# Patient Record
Sex: MALE | Race: WHITE | Employment: UNEMPLOYED | URBAN - NONMETROPOLITAN AREA
[De-identification: names, ages, dates, MRNs, and addresses within clinical notes are randomized per-mention and may not be internally consistent; named-entity substitution may affect disease eponyms.]

---

## 2021-12-28 ENCOUNTER — OFFICE VISIT (OUTPATIENT)
Dept: URGENT CARE | Age: 31
End: 2021-12-28
Payer: COMMERCIAL

## 2021-12-28 VITALS
HEART RATE: 72 BPM | BODY MASS INDEX: 30.88 KG/M2 | DIASTOLIC BLOOD PRESSURE: 74 MMHG | RESPIRATION RATE: 18 BRPM | OXYGEN SATURATION: 96 % | WEIGHT: 233 LBS | TEMPERATURE: 97.9 F | SYSTOLIC BLOOD PRESSURE: 118 MMHG | HEIGHT: 73 IN

## 2021-12-28 DIAGNOSIS — J06.9 UPPER RESPIRATORY TRACT INFECTION, UNSPECIFIED TYPE: Primary | ICD-10-CM

## 2021-12-28 LAB
INFLUENZA A ANTIBODY: NEGATIVE
INFLUENZA B ANTIBODY: NEGATIVE
SARS-COV-2, PCR: DETECTED

## 2021-12-28 PROCEDURE — 99213 OFFICE O/P EST LOW 20 MIN: CPT | Performed by: NURSE PRACTITIONER

## 2021-12-28 PROCEDURE — 87804 INFLUENZA ASSAY W/OPTIC: CPT | Performed by: NURSE PRACTITIONER

## 2021-12-28 RX ORDER — METHYLPREDNISOLONE 4 MG/1
TABLET ORAL
Qty: 1 KIT | Refills: 0 | Status: SHIPPED | OUTPATIENT
Start: 2021-12-28 | End: 2022-01-03

## 2021-12-28 ASSESSMENT — ENCOUNTER SYMPTOMS
SINUS COMPLAINT: 1
SINUS PRESSURE: 1
SINUS PAIN: 1
COUGH: 1
ABDOMINAL PAIN: 0
VOMITING: 0
WHEEZING: 0
SHORTNESS OF BREATH: 0
NAUSEA: 1

## 2021-12-28 NOTE — PATIENT INSTRUCTIONS

## 2021-12-28 NOTE — PROGRESS NOTES
[address] [place] [address]   [address], [address]     Phone:  [phone]  Fax:  [phone]      Jessicarand Salcedo is a 27 y.o. male who presents today for his medical conditions/complaints as noted below. Claude Cloud. is c/o of Sinus Problem, Chills, Nausea, and Generalized Body Aches (joint pain )      Chief Complaint   Patient presents with    Sinus Problem    Chills    Nausea    Generalized Body Aches     joint pain        HPI:   Paulie Tessa. presents today for   Sinus Problem  This is a new problem. The current episode started in the past 7 days. The problem has been gradually worsening since onset. There has been no fever (unsure). The pain is mild. Associated symptoms include chills, congestion, coughing, headaches and sinus pressure. Pertinent negatives include no shortness of breath. Past treatments include acetaminophen and oral decongestants. The treatment provided no relief. Generalized Body Aches  This is a new problem. The current episode started in the past 7 days (2 days). The problem occurs constantly. The problem has been rapidly worsening. Associated symptoms include chills, congestion, coughing, a fever, headaches and nausea. Pertinent negatives include no abdominal pain, fatigue or vomiting. Nothing aggravates the symptoms. He has tried acetaminophen (decongestant) for the symptoms. The treatment provided no relief. He has had J&J COVID vaccine. He has been working in [email or web address]. No past medical history on file. No past surgical history on file. Social History     Tobacco Use    Smoking status: Current Every Day Smoker     Packs/day: 0.50     Types: Cigarettes    Smokeless tobacco: Not on file   Substance Use Topics    Alcohol use: No        Current Outpatient Medications   Medication Sig Dispense Refill    methylPREDNISolone (MEDROL DOSEPACK) 4 MG tablet Take by mouth.  1 kit 0     No current facility-administered medications for this visit. No Known Allergies    No family history on file. Review of Systems   Constitutional: Positive for chills and fever. Negative for fatigue. HENT: Positive for congestion, sinus pressure and sinus pain. Respiratory: Positive for cough. Negative for shortness of breath and wheezing. Cardiovascular: Negative. Gastrointestinal: Positive for nausea. Negative for abdominal pain and vomiting. Neurological: Positive for headaches. Objective:     Physical Exam  Vitals and nursing note reviewed. Constitutional:       General: He is not in acute distress. Appearance: Normal appearance. He is well-developed. He is not ill-appearing, toxic-appearing or diaphoretic. HENT:      Head: Normocephalic and atraumatic. Right Ear: Tympanic membrane, ear canal and external ear normal. There is no impacted cerumen. Left Ear: Tympanic membrane, ear canal and external ear normal. There is no impacted cerumen. Nose: Congestion present. No rhinorrhea. Mouth/Throat:      Pharynx: Oropharynx is clear. No oropharyngeal exudate or posterior oropharyngeal erythema. Eyes:      General:         Right eye: No discharge. Left eye: No discharge. Conjunctiva/sclera: Conjunctivae normal.   Cardiovascular:      Rate and Rhythm: Normal rate and regular rhythm. Heart sounds: Normal heart sounds. Pulmonary:      Effort: Pulmonary effort is normal. No respiratory distress. Breath sounds: Normal breath sounds. No stridor. No wheezing, rhonchi or rales. Musculoskeletal:         General: Normal range of motion. Cervical back: Normal range of motion and neck supple. Lymphadenopathy:      Cervical: No cervical adenopathy. Skin:     General: Skin is warm and dry. Capillary Refill: Capillary refill takes less than 2 seconds. Findings: No rash. Neurological:      Mental Status: He is alert and oriented to person, place, and time.       Motor: No weakness. Gait: Gait normal.   Psychiatric:         Mood and Affect: Mood normal.         Behavior: Behavior normal.         Thought Content: Thought content normal.         /74   Pulse 72   Temp 97.9 °F (36.6 °C) (Temporal)   Resp 18   Ht 6' 1\" (1.854 m)   Wt 233 lb (105.7 kg)   SpO2 96%   BMI 30.74 kg/m²     Assessment:      Diagnosis Orders   1. Upper respiratory tract infection, unspecified type  POCT Influenza A/B    COVID-19    methylPREDNISolone (MEDROL DOSEPACK) 4 MG tablet       Results for orders placed or performed in visit on 12/28/21   POCT Influenza A/B   Result Value Ref Range    Influenza A Ab Negative     Influenza B Ab Negative        Plan:     Flu negative  Start steroid pack,MVI, and Robitussin as needed  COVID test today  Quarantine until results return    Return if symptoms worsen or fail to improve. Orders Placed This Encounter   Procedures    COVID-19     Scheduling Instructions:      1) Due to current limited availability of the COVID-19 test, tests will be prioritized based on responses to questions above. Testing may be delayed due to volume. 2) Print and instruct patient to adhere to CDC home isolation program. (Link Above)              3) Set up or refer patient for a monitoring program.              4) Have patient sign up for and leverage [employer] (if not previously done). Order Specific Question:   Is this test for diagnosis or screening? Answer:   Diagnosis of ill patient     Order Specific Question:   Symptomatic for COVID-19 as defined by CDC? Answer:   Yes     Order Specific Question:   Date of Symptom Onset     Answer:   12/22/2021     Order Specific Question:   Hospitalized for COVID-19? Answer:   No     Order Specific Question:   Admitted to ICU for COVID-19? Answer:   No     Order Specific Question:   Employed in healthcare setting? Answer:   No     Order Specific Question:   Resident in a congregate (group) care setting? Answer:   No     Order Specific Question:   Pregnant: Answer:   No     Order Specific Question:   Previously tested for COVID-19? Answer:   No    POCT Influenza A/B       Orders Placed This Encounter   Medications    methylPREDNISolone (MEDROL DOSEPACK) 4 MG tablet     Sig: Take by mouth. Dispense:  1 kit     Refill:  0        Patient offered educational materials - see patient instructions for any instruction needed. Discussed use, benefit, and side effects of prescribed medications. All patient questions answered. Instructed to continue current medications, diet and exercise. Patient agreed with treatment plan. Follow up as directed. Patient was advised to go to the ED if condition ever becomes emergent.        Electronically signed by Sean Sutton on 12/28/2021 at 10:07 AM

## 2022-02-14 ENCOUNTER — OFFICE VISIT (OUTPATIENT)
Age: 32
End: 2022-02-14
Payer: COMMERCIAL

## 2022-02-14 VITALS
SYSTOLIC BLOOD PRESSURE: 134 MMHG | OXYGEN SATURATION: 96 % | TEMPERATURE: 98.5 F | HEART RATE: 85 BPM | BODY MASS INDEX: 32.19 KG/M2 | RESPIRATION RATE: 18 BRPM | DIASTOLIC BLOOD PRESSURE: 62 MMHG | WEIGHT: 244 LBS

## 2022-02-14 DIAGNOSIS — Z11.52 ENCOUNTER FOR SCREENING FOR COVID-19: Primary | ICD-10-CM

## 2022-02-14 DIAGNOSIS — R51.9 NONINTRACTABLE HEADACHE, UNSPECIFIED CHRONICITY PATTERN, UNSPECIFIED HEADACHE TYPE: ICD-10-CM

## 2022-02-14 DIAGNOSIS — Z86.16 PERSONAL HISTORY OF COVID-19: ICD-10-CM

## 2022-02-14 DIAGNOSIS — R50.9 FEVER, UNSPECIFIED FEVER CAUSE: ICD-10-CM

## 2022-02-14 DIAGNOSIS — R68.83 CHILLS: ICD-10-CM

## 2022-02-14 LAB
INFLUENZA A ANTIBODY: NORMAL
INFLUENZA B ANTIBODY: NORMAL
S PYO AG THROAT QL: NORMAL
SARS-COV-2, PCR: DETECTED

## 2022-02-14 PROCEDURE — 87880 STREP A ASSAY W/OPTIC: CPT | Performed by: NURSE PRACTITIONER

## 2022-02-14 PROCEDURE — 87804 INFLUENZA ASSAY W/OPTIC: CPT | Performed by: NURSE PRACTITIONER

## 2022-02-14 PROCEDURE — 99213 OFFICE O/P EST LOW 20 MIN: CPT | Performed by: NURSE PRACTITIONER

## 2022-02-14 ASSESSMENT — ENCOUNTER SYMPTOMS
ALLERGIC/IMMUNOLOGIC NEGATIVE: 1
DIARRHEA: 0
NAUSEA: 1
SHORTNESS OF BREATH: 0
VOMITING: 0
ABDOMINAL PAIN: 0
SINUS PRESSURE: 0
SORE THROAT: 1
COUGH: 0

## 2022-02-14 ASSESSMENT — VISUAL ACUITY: OU: 1

## 2022-02-14 NOTE — PATIENT INSTRUCTIONS
Plenty of fluids  Rest  OTC Tylenol or Motrin as needed  Stay home and stay in until we call with COVID results  Follow up with PCP or return to Urgent Care for worsening or unresolved symptoms. Patient Education        Learning About Coronavirus [phone])  What is coronavirus (COVID-19)? COVID-19 is a disease caused by a type of coronavirus. This illness was first found in December 2019. It has since spread worldwide. Coronaviruses are a large group of viruses. They cause the common cold. They also cause more serious illnesses like [place] respiratory syndrome (MERS) and severe acute respiratory syndrome (SARS). COVID-19 is caused by a novel coronavirus. That means it's a new type that has not been seen in people before. What are the symptoms? COVID-19 symptoms may include:  · Fever. · Cough. · Trouble breathing. · Chills or repeated shaking with chills. · Muscle and body aches. · Headache. · Sore throat. · New loss of taste or smell. · Vomiting. · Diarrhea. In severe cases, COVID-19 can cause pneumonia and make it hard to breathe without help from a machine. It can cause death. How is it diagnosed? COVID-19 is diagnosed with a viral test. This may also be called a PCR test or antigen test. It looks for evidence of the virus in your breathing passages or lungs (respiratory system). The test is most often done on a sample from the nose, throat, or lungs. It's sometimes done on a sample of saliva. One way a sample is collected is by putting a long swab into the back of your nose. How is it treated? Mild cases of COVID-19 can be treated at home. Serious cases need treatment in the hospital. Treatment may include medicines to reduce symptoms, plus breathing support such as oxygen therapy or a ventilator. Some people may be placed on their belly to help their oxygen levels. Treatments that may help people who have COVID-19 include:  Antiviral medicines.   These medicines treat viral infections. Remdesivir is an example. Immune-based therapy. These medicines help the immune system fight COVID-19. Examples include monoclonal antibodies. Blood thinners. These medicines help prevent blood clots. People with severe illness are at risk for blood clots. How can you protect yourself and others? · Get vaccinated. · Avoid sick people. · Cover your mouth with a tissue when you cough or sneeze. · Wash your hands often, especially after you cough or sneeze. Use soap and water, and scrub for at least 20 seconds. If soap and water aren't available, use an alcohol-based hand . · Avoid touching your mouth, nose, and eyes. Be sure to follow all instructions from the [facility] and your local health authorities. Here are some examples of specific precautions you may need to take. · If you are not fully vaccinated:  ? Wear a mask if you have to go to public areas. ? Avoid crowds and try to stay at least 6 feet away from other people. · If you have been exposed to the virus and are not fully vaccinated:  ? Stay home. Don't go to school, work, or public areas. And don't use public transportation, ride-shares, or taxis unless you have no choice. ? Wear a mask if you have to go to public areas, like the pharmacy. · Even if you're fully vaccinated, there's still a small chance you can get and spread COVID-19. If you live in an area where COVID-19 is spreading quickly, wear a mask if you have to go to indoor public areas. You might also want to wear a mask in crowded outdoor areas if you:  ? Have certain health conditions. ? Live with someone who has a compromised immune system. ? Live with someone who is not fully vaccinated. · If you have been exposed and you are fully vaccinated:  ? Talk to your doctor. You may need a COVID-19 test.  ? Wear a mask in public indoor spaces for 14 days or until you test negative for COVID-19. If you're sick:  · Leave your home only if you need to get medical care. But call the doctor's office first so they know you're coming. And wear a mask. · Wear a mask whenever you're around other people. · Limit contact with pets and people in your home. If possible, stay in a separate bedroom and use a separate bathroom. · Clean and disinfect your home every day. Use household  and disinfectant wipes or sprays. Take special care to clean things that you touch with your hands. How can you self-isolate when you have COVID-19? If you have COVID-19, there are things you can do to help avoid spreading the virus to others. · Limit contact with people in your home. If possible, stay in a separate bedroom and use a separate bathroom. · Wear a mask when you are around other people. · If you have to leave home, avoid crowds and try to stay at least 6 feet away from other people. · Avoid contact with pets and other animals. · Cover your mouth and nose with a tissue when you cough or sneeze. Then throw it in the trash right away. · Wash your hands often, especially after you cough or sneeze. Use soap and water, and scrub for at least 20 seconds. If soap and water aren't available, use an alcohol-based hand . · Don't share personal household items. These include bedding, towels, cups and glasses, and eating utensils. · [address] in the warmest water allowed for the fabric type, and dry it completely. It's okay to wash other people's laundry with yours. · Clean and disinfect your home. Use household  and disinfectant wipes or sprays. When should you call for help? Call 911 anytime you think you may need emergency care. For example, call if you have life-threatening symptoms, such as:    · You have severe trouble breathing.  (You can't talk at all.)     · You have constant chest pain or pressure.     · You are severely dizzy or lightheaded.     · You are confused or can't think clearly.     · You have pale, gray, or blue-colored skin or lips.     · You pass out (lose consciousness) or are very hard to wake up. Call your doctor now or seek immediate medical care if:    · You have moderate trouble breathing. (You can't speak a full sentence.)     · You are coughing up blood (more than about 1 teaspoon).     · You have signs of low blood pressure. These include feeling lightheaded; being too weak to stand; and having cold, pale, clammy skin. Watch closely for changes in your health, and be sure to contact your doctor if:    · Your symptoms get worse.     · You are not getting better as expected.     · You have new or worse symptoms of anxiety, depression, nightmares, or flashbacks. Call before you go to the doctor's office. Follow their instructions. And wear a mask. Current as of: July 1, 2021               Content Version: 13.1  © 2006-2021 [employer]. Care instructions adapted under license by [facility] ([facility]). If you have questions about a medical condition or this instruction, always ask your healthcare professional. [place] [address] any warranty or liability for your use of this information. Patient Education        Coronavirus ([id number]-83): Care Instructions  Overview  The coronavirus disease (COVID-19) is caused by a virus. Symptoms may include a fever, a cough, and shortness of breath. It can spread through droplets from coughing and sneezing, breathing, and singing. The virus also can spread when people are in close contact with someone who is infected. Most people have mild symptoms and can take care of themselves at home. If their symptoms get worse, they may need care in a hospital. Treatment may include medicines to reduce symptoms, plus breathing support such as oxygen therapy or a ventilator. It's important to not spread the virus to others. If you have COVID-19, wear a mask anytime you are around other people. It can help stop the spread of the virus. You need to isolate yourself while you are sick.  Leave your home only if you need to get medical care or testing. Follow-up care is a key part of your treatment and safety. Be sure to make and go to all appointments, and call your doctor if you are having problems. It's also a good idea to know your test results and keep a list of the medicines you take. How can you care for yourself at home? · Get extra rest. It can help you feel better. · Drink plenty of fluids. This helps replace fluids lost from fever. Fluids may also help ease a scratchy throat. · You can take acetaminophen (Tylenol) or ibuprofen (Advil, Motrin) to reduce a fever. It may also help with muscle and body aches. Read and follow all instructions on the label. · Use petroleum jelly on sore skin. This can help if the skin around your nose and lips becomes sore from rubbing a lot with tissues. If you use oxygen, use a water-based product instead of petroleum jelly. · Keep track of symptoms such as fever and shortness of breath. This can help you know if you need to call your doctor. It can also help you know when it's safe to be around other people. · In some cases, your doctor might suggest that you get a pulse oximeter. How can you self-isolate when you have COVID-19? If you have COVID-19, there are things you can do to help avoid spreading the virus to others. · Limit contact with people in your home. If possible, stay in a separate bedroom and use a separate bathroom. · Wear a mask when you are around other people. · If you have to leave home, avoid crowds and try to stay at least 6 feet away from other people. · Avoid contact with pets and other animals. · Cover your mouth and nose with a tissue when you cough or sneeze. Then throw it in the trash right away. · Wash your hands often, especially after you cough or sneeze. Use soap and water, and scrub for at least 20 seconds. If soap and water aren't available, use an alcohol-based hand . · Don't share personal household items. These include bedding, towels, cups and glasses, and eating utensils. · [address] in the warmest water allowed for the fabric type, and dry it completely. It's okay to wash other people's laundry with yours. · Clean and disinfect your home. Use household  and disinfectant wipes or sprays. When can you end self-isolation for COVID-19? If you know or think that you have the virus, you will need to self-isolate. You can be around others after:  · It's been at least 10 days since your symptoms started and  · You haven't had a fever for 24 hours without taking medicines to lower the fever and  · Your symptoms are improving. If you tested positive but have no symptoms, you can end isolation after 10 days. But if you start to have symptoms, follow the steps above. Ask your doctor if you need to be tested before you end isolation. This is especially important if you have a weakened immune system. When should you call for help? Call 911 anytime you think you may need emergency care. For example, call if you have life-threatening symptoms, such as:    · You have severe trouble breathing. (You can't talk at all.)     · You have constant chest pain or pressure.     · You are severely dizzy or lightheaded.     · You are confused or can't think clearly.     · You have pale, gray, or blue-colored skin or lips.     · You pass out (lose consciousness) or are very hard to wake up. Call your doctor now or seek immediate medical care if:    · You have moderate trouble breathing. (You can't speak a full sentence.)     · You are coughing up blood (more than about 1 teaspoon).     · You have signs of low blood pressure. These include feeling lightheaded; being too weak to stand; and having cold, pale, clammy skin.    Watch closely for changes in your health, and be sure to contact your doctor if:    · Your symptoms get worse.     · You are not getting better as expected.     · You have new or worse symptoms of anxiety, depression, nightmares, or flashbacks. Call before you go to the doctor's office. Follow their instructions. And wear a mask. Current as of: July 1, 2021               Content Version: 13.1  © 5779-8809 [employer], [employer]. Care instructions adapted under license by [facility] ([facility]). If you have questions about a medical condition or this instruction, always ask your healthcare professional. [place] [address] any warranty or liability for your use of this information.

## 2022-02-14 NOTE — PROGRESS NOTES
[facility]  [address] [address] [place] [address]  Dept: [phone]  Dept Fax: [phone]: [phone]    Jordyn Thomason is a 27 y.o. male who presents today for his medical conditions/complaintsas noted below. Claude Thomason is c/o of Chills, Nausea, and Fatigue        HPI:     Fatigue  This is a new problem. The current episode started today. The problem occurs constantly. The problem has been unchanged. Associated symptoms include anorexia, chills, diaphoresis, fatigue, headaches, myalgias, nausea and a sore throat. Pertinent negatives include no abdominal pain, arthralgias, congestion, coughing, fever, rash or vomiting. Nothing aggravates the symptoms. He has tried nothing for the symptoms. The treatment provided no relief. Jordyn Mills woke up at 3 am today to go to the gym. He felt fatigued and felt like his body was off. He went to work and worked a couple of hours and was very sweaty and fatigued. He also has some nausea, body aches and poor appetite. He was positive for COVID Dec 28th,2021. His girlfriend was diagnosed with COVID 5 days ago. History reviewed. No pertinent past medical history. No past surgical history on file. No family history on file. Social History     Tobacco Use    Smoking status: Current Every Day Smoker     Packs/day: 0.50     Types: Cigarettes    Smokeless tobacco: Not on file   Substance Use Topics    Alcohol use: No      No current outpatient medications on file. No current facility-administered medications for this visit.      No Known Allergies    Health Maintenance   Topic Date Due    Hepatitis C screen  Never done    Varicella vaccine (1 of 2 - 2-dose childhood series) Never done    COVID-19 Vaccine (1) Never done    Pneumococcal 0-64 years Vaccine (1 of 2 - PPSV23) Never done    Depression Screen  Never done    HIV screen  Never done    DTaP/Tdap/Td vaccine (1 - Tdap) Never done    Flu vaccine (1) Never done    Hepatitis A vaccine  Aged Out    Hepatitis B vaccine  Aged Out    Hib vaccine  Aged Out    Meningococcal (ACWY) vaccine  Aged Out       Subjective:     Review of Systems   Constitutional: Positive for appetite change, chills, diaphoresis and fatigue. Negative for activity change and fever. Sweats   HENT: Positive for sore throat. Negative for congestion, ear discharge and sinus pressure. Respiratory: Negative for cough and shortness of breath. Gastrointestinal: Positive for anorexia and nausea. Negative for abdominal pain, diarrhea and vomiting. Musculoskeletal: Positive for myalgias. Negative for arthralgias. Skin: Negative for rash. Allergic/Immunologic: Negative. Neurological: Positive for headaches.       :Objective      Physical Exam  Vitals and nursing note reviewed. Constitutional:       General: He is awake. He is not in acute distress. Appearance: He is well-developed and well-groomed. He is ill-appearing. HENT:      Head: Normocephalic. Right Ear: Hearing, tympanic membrane, ear canal and external ear normal.      Left Ear: Hearing, tympanic membrane, ear canal and external ear normal.      Nose: Nose normal. No congestion. Right Sinus: No frontal sinus tenderness. Left Sinus: No frontal sinus tenderness. Mouth/Throat:      Lips: Pink. Mouth: Mucous membranes are moist.      Pharynx: Oropharynx is clear. Uvula midline. Posterior oropharyngeal erythema and uvula swelling present. Tonsils: 0 on the right. 0 on the left. Eyes:      General: Vision grossly intact. Neck:      Trachea: Phonation normal.   Cardiovascular:      Rate and Rhythm: Normal rate and regular rhythm. Heart sounds: Normal heart sounds, S1 normal and S2 normal. No murmur heard. No friction rub. No gallop. Pulmonary:      Effort: Pulmonary effort is normal. No respiratory distress.       Breath sounds: Normal breath sounds and air entry. No wheezing, rhonchi or rales. Abdominal:      Palpations: Abdomen is soft. Musculoskeletal:         General: No tenderness or deformity. Normal range of motion. Cervical back: Full passive range of motion without pain and neck supple. Lymphadenopathy:      Head:      Right side of head: No tonsillar adenopathy. Left side of head: No tonsillar adenopathy. Skin:     General: Skin is warm and dry. Capillary Refill: Capillary refill takes less than 2 seconds. Neurological:      General: No focal deficit present. Mental Status: He is alert, oriented to person, place, and time and easily aroused. Psychiatric:         Attention and Perception: Attention normal.         Mood and Affect: Mood normal.         Speech: Speech normal.         Behavior: Behavior normal. Behavior is cooperative. /62   Pulse 85   Temp 98.5 °F (36.9 °C) (Temporal)   Resp 18   Wt 244 lb (110.7 kg)   SpO2 96%   BMI 32.19 kg/m²     :Assessment       Diagnosis Orders   1. Encounter for screening for COVID-19  COVID-19   2. Chills  POCT Influenza A/B    POCT rapid strep A    COVID-19   3. Personal history of COVID-19     4. Fever, unspecified fever cause  COVID-19   5. Nonintractable headache, unspecified chronicity pattern, unspecified headache type  COVID-19       :Plan    Plenty of fluids  Rest  OTC Tylenol or Motrin as needed  Stay home and stay in until we call with COVID results  Follow up with PCP or return to Urgent Care for worsening or unresolved symptoms. Orders Placed This Encounter   Procedures    COVID-19     Scheduling Instructions:      1) Due to current limited availability of the COVID-19 test, tests will be prioritized based on responses to questions above. Testing may be delayed due to volume.             2) Print and instruct patient to adhere to [facility] home isolation program. (Link Above)              3) Set up or refer patient for a monitoring program.   4) Have patient sign up for and leverage MyChart (if not previously done). Order Specific Question:   Is this test for diagnosis or screening? Answer:   Diagnosis of ill patient     Order Specific Question:   Symptomatic for COVID-19 as defined by CDC? Answer:   Yes     Order Specific Question:   Date of Symptom Onset     Answer:   2/14/2022     Order Specific Question:   Hospitalized for COVID-19? Answer:   No     Order Specific Question:   Admitted to ICU for COVID-19? Answer:   No     Order Specific Question:   Employed in healthcare setting? Answer:   No     Order Specific Question:   Resident in a congregate (group) care setting? Answer:   No     Order Specific Question:   Pregnant: Answer:   No     Order Specific Question:   Previously tested for COVID-19? Answer: Yes    COVID-19    COVID-19    POCT Influenza A/B    POCT rapid strep A     Results for orders placed or performed in visit on 02/14/22   POCT Influenza A/B   Result Value Ref Range    Influenza A Ab neg     Influenza B Ab neg    POCT rapid strep A   Result Value Ref Range    Strep A Ag None Detected None Detected         Since pt is being tested for COVID pt has been instructed to quarantine from contacts until testing has been resulted. Further instructions will follow, as of now, this is 10 days unless otherwise specified when results are back. If SOB or worsening sx's develop, need to go to ED or return to clinic, pt voiced understanding. Pt was given printed instructions today on Possible COVID-19 infection with self-quarantine and management of symptoms    Call or return to clinic prn if these symptoms worsen or fail to improve as anticipated. No follow-ups on file. No orders of the defined types were placed in this encounter.        Patient Instructions     Plenty of fluids  Rest  OTC Tylenol or Motrin as needed  Stay home and stay in until we call with COVID results  Follow up with PCP or return to Urgent Care for worsening or unresolved symptoms. Patient Education        Learning About Coronavirus [phone])  What is coronavirus (COVID-19)? COVID-19 is a disease caused by a type of coronavirus. This illness was first found in December 2019. It has since spread worldwide. Coronaviruses are a large group of viruses. They cause the common cold. They also cause more serious illnesses like [place] respiratory syndrome (MERS) and severe acute respiratory syndrome (SARS). COVID-19 is caused by a novel coronavirus. That means it's a new type that has not been seen in people before. What are the symptoms? COVID-19 symptoms may include:  · Fever. · Cough. · Trouble breathing. · Chills or repeated shaking with chills. · Muscle and body aches. · Headache. · Sore throat. · New loss of taste or smell. · Vomiting. · Diarrhea. In severe cases, COVID-19 can cause pneumonia and make it hard to breathe without help from a machine. It can cause death. How is it diagnosed? COVID-19 is diagnosed with a viral test. This may also be called a PCR test or antigen test. It looks for evidence of the virus in your breathing passages or lungs (respiratory system). The test is most often done on a sample from the nose, throat, or lungs. It's sometimes done on a sample of saliva. One way a sample is collected is by putting a long swab into the back of your nose. How is it treated? Mild cases of COVID-19 can be treated at home. Serious cases need treatment in the hospital. Treatment may include medicines to reduce symptoms, plus breathing support such as oxygen therapy or a ventilator. Some people may be placed on their belly to help their oxygen levels. Treatments that may help people who have COVID-19 include:  Antiviral medicines. These medicines treat viral infections. Remdesivir is an example. Immune-based therapy. These medicines help the immune system fight COVID-19.  Examples include monoclonal antibodies. Blood thinners. These medicines help prevent blood clots. People with severe illness are at risk for blood clots. How can you protect yourself and others? · Get vaccinated. · Avoid sick people. · Cover your mouth with a tissue when you cough or sneeze. · Wash your hands often, especially after you cough or sneeze. Use soap and water, and scrub for at least 20 seconds. If soap and water aren't available, use an alcohol-based hand . · Avoid touching your mouth, nose, and eyes. Be sure to follow all instructions from the [facility] and your local health authorities. Here are some examples of specific precautions you may need to take. · If you are not fully vaccinated:  ? Wear a mask if you have to go to public areas. ? Avoid crowds and try to stay at least 6 feet away from other people. · If you have been exposed to the virus and are not fully vaccinated:  ? Stay home. Don't go to school, work, or public areas. And don't use public transportation, ride-shares, or taxis unless you have no choice. ? Wear a mask if you have to go to public areas, like the pharmacy. · Even if you're fully vaccinated, there's still a small chance you can get and spread COVID-19. If you live in an area where COVID-19 is spreading quickly, wear a mask if you have to go to indoor public areas. You might also want to wear a mask in crowded outdoor areas if you:  ? Have certain health conditions. ? Live with someone who has a compromised immune system. ? Live with someone who is not fully vaccinated. · If you have been exposed and you are fully vaccinated:  ? Talk to your doctor. You may need a COVID-19 test.  ? Wear a mask in public indoor spaces for 14 days or until you test negative for COVID-19. If you're sick:  · Leave your home only if you need to get medical care. But call the doctor's office first so they know you're coming. And wear a mask.   · Wear a mask whenever you're around other people. · Limit contact with pets and people in your home. If possible, stay in a separate bedroom and use a separate bathroom. · Clean and disinfect your home every day. Use household  and disinfectant wipes or sprays. Take special care to clean things that you touch with your hands. How can you self-isolate when you have COVID-19? If you have COVID-19, there are things you can do to help avoid spreading the virus to others. · Limit contact with people in your home. If possible, stay in a separate bedroom and use a separate bathroom. · Wear a mask when you are around other people. · If you have to leave home, avoid crowds and try to stay at least 6 feet away from other people. · Avoid contact with pets and other animals. · Cover your mouth and nose with a tissue when you cough or sneeze. Then throw it in the trash right away. · Wash your hands often, especially after you cough or sneeze. Use soap and water, and scrub for at least 20 seconds. If soap and water aren't available, use an alcohol-based hand . · Don't share personal household items. These include bedding, towels, cups and glasses, and eating utensils. · [address] in the warmest water allowed for the fabric type, and dry it completely. It's okay to wash other people's laundry with yours. · Clean and disinfect your home. Use household  and disinfectant wipes or sprays. When should you call for help? Call 911 anytime you think you may need emergency care. For example, call if you have life-threatening symptoms, such as:    · You have severe trouble breathing. (You can't talk at all.)     · You have constant chest pain or pressure.     · You are severely dizzy or lightheaded.     · You are confused or can't think clearly.     · You have pale, gray, or blue-colored skin or lips.     · You pass out (lose consciousness) or are very hard to wake up.    Call your doctor now or seek immediate medical care if:    · You have moderate trouble breathing. (You can't speak a full sentence.)     · You are coughing up blood (more than about 1 teaspoon).     · You have signs of low blood pressure. These include feeling lightheaded; being too weak to stand; and having cold, pale, clammy skin. Watch closely for changes in your health, and be sure to contact your doctor if:    · Your symptoms get worse.     · You are not getting better as expected.     · You have new or worse symptoms of anxiety, depression, nightmares, or flashbacks. Call before you go to the doctor's office. Follow their instructions. And wear a mask. Current as of: July 1, 2021               Content Version: 13.1  © 2006-2021 [employer]. Care instructions adapted under license by [facility] ([facility]). If you have questions about a medical condition or this instruction, always ask your healthcare professional. [place] [address] any warranty or liability for your use of this information. Patient Education        Coronavirus ([id number]): Care Instructions  Overview  The coronavirus disease (COVID-19) is caused by a virus. Symptoms may include a fever, a cough, and shortness of breath. It can spread through droplets from coughing and sneezing, breathing, and singing. The virus also can spread when people are in close contact with someone who is infected. Most people have mild symptoms and can take care of themselves at home. If their symptoms get worse, they may need care in a hospital. Treatment may include medicines to reduce symptoms, plus breathing support such as oxygen therapy or a ventilator. It's important to not spread the virus to others. If you have COVID-19, wear a mask anytime you are around other people. It can help stop the spread of the virus. You need to isolate yourself while you are sick. Leave your home only if you need to get medical care or testing. Follow-up care is a key part of your treatment and safety.  Be sure to make and go to all appointments, and call your doctor if you are having problems. It's also a good idea to know your test results and keep a list of the medicines you take. How can you care for yourself at home? · Get extra rest. It can help you feel better. · Drink plenty of fluids. This helps replace fluids lost from fever. Fluids may also help ease a scratchy throat. · You can take acetaminophen (Tylenol) or ibuprofen (Advil, Motrin) to reduce a fever. It may also help with muscle and body aches. Read and follow all instructions on the label. · Use petroleum jelly on sore skin. This can help if the skin around your nose and lips becomes sore from rubbing a lot with tissues. If you use oxygen, use a water-based product instead of petroleum jelly. · Keep track of symptoms such as fever and shortness of breath. This can help you know if you need to call your doctor. It can also help you know when it's safe to be around other people. · In some cases, your doctor might suggest that you get a pulse oximeter. How can you self-isolate when you have COVID-19? If you have COVID-19, there are things you can do to help avoid spreading the virus to others. · Limit contact with people in your home. If possible, stay in a separate bedroom and use a separate bathroom. · Wear a mask when you are around other people. · If you have to leave home, avoid crowds and try to stay at least 6 feet away from other people. · Avoid contact with pets and other animals. · Cover your mouth and nose with a tissue when you cough or sneeze. Then throw it in the trash right away. · Wash your hands often, especially after you cough or sneeze. Use soap and water, and scrub for at least 20 seconds. If soap and water aren't available, use an alcohol-based hand . · Don't share personal household items. These include bedding, towels, cups and glasses, and eating utensils.   · [address] in the warmest water allowed for the fabric type, and dry it completely. It's okay to wash other people's laundry with yours. · Clean and disinfect your home. Use household  and disinfectant wipes or sprays. When can you end self-isolation for COVID-19? If you know or think that you have the virus, you will need to self-isolate. You can be around others after:  · It's been at least 10 days since your symptoms started and  · You haven't had a fever for 24 hours without taking medicines to lower the fever and  · Your symptoms are improving. If you tested positive but have no symptoms, you can end isolation after 10 days. But if you start to have symptoms, follow the steps above. Ask your doctor if you need to be tested before you end isolation. This is especially important if you have a weakened immune system. When should you call for help? Call 911 anytime you think you may need emergency care. For example, call if you have life-threatening symptoms, such as:    · You have severe trouble breathing. (You can't talk at all.)     · You have constant chest pain or pressure.     · You are severely dizzy or lightheaded.     · You are confused or can't think clearly.     · You have pale, gray, or blue-colored skin or lips.     · You pass out (lose consciousness) or are very hard to wake up. Call your doctor now or seek immediate medical care if:    · You have moderate trouble breathing. (You can't speak a full sentence.)     · You are coughing up blood (more than about 1 teaspoon).     · You have signs of low blood pressure. These include feeling lightheaded; being too weak to stand; and having cold, pale, clammy skin. Watch closely for changes in your health, and be sure to contact your doctor if:    · Your symptoms get worse.     · You are not getting better as expected.     · You have new or worse symptoms of anxiety, depression, nightmares, or flashbacks. Call before you go to the doctor's office. Follow their instructions.  And wear a mask. Current as of: July 1, 2021               Content Version: 13.1  © 2006-2021 [employer]. Care instructions adapted under license by [facility] ([facility]). If you have questions about a medical condition or this instruction, always ask your healthcare professional. [place] [address] any warranty or liability for your use of this information. Patient given educational materials- see patient instructions. Discussed use, benefit, and side effects of prescribedmedications. All patient questions answered. Pt voiced understanding.        Electronically signed by MARIAJOSE Ordoñez CNP on 2/14/2022 at 1:34 PM

## 2023-09-23 ENCOUNTER — OFFICE VISIT (OUTPATIENT)
Age: 33
End: 2023-09-23
Payer: COMMERCIAL

## 2023-09-23 VITALS
BODY MASS INDEX: 29.95 KG/M2 | RESPIRATION RATE: 20 BRPM | HEART RATE: 62 BPM | WEIGHT: 226 LBS | TEMPERATURE: 97.9 F | SYSTOLIC BLOOD PRESSURE: 124 MMHG | HEIGHT: 73 IN | DIASTOLIC BLOOD PRESSURE: 66 MMHG | OXYGEN SATURATION: 98 %

## 2023-09-23 DIAGNOSIS — K04.7 ABSCESS, DENTAL: Primary | ICD-10-CM

## 2023-09-23 DIAGNOSIS — K08.89 TOOTH PAIN: ICD-10-CM

## 2023-09-23 PROCEDURE — 99213 OFFICE O/P EST LOW 20 MIN: CPT | Performed by: NURSE PRACTITIONER

## 2023-09-23 RX ORDER — KETOROLAC TROMETHAMINE 10 MG/1
10 TABLET, FILM COATED ORAL EVERY 6 HOURS PRN
Qty: 12 TABLET | Refills: 0 | Status: SHIPPED | OUTPATIENT
Start: 2023-09-23 | End: 2023-09-27

## 2023-09-23 RX ORDER — AMOXICILLIN 500 MG/1
500 CAPSULE ORAL 3 TIMES DAILY
Qty: 30 CAPSULE | Refills: 0 | Status: SHIPPED | OUTPATIENT
Start: 2023-09-23 | End: 2023-10-03

## 2023-09-23 ASSESSMENT — ENCOUNTER SYMPTOMS
ALLERGIC/IMMUNOLOGIC NEGATIVE: 1
DIARRHEA: 0
NAUSEA: 0
RESPIRATORY NEGATIVE: 1
ABDOMINAL PAIN: 0
VOMITING: 0
FACIAL SWELLING: 0
SORE THROAT: 0
SINUS PRESSURE: 1

## 2023-09-23 ASSESSMENT — VISUAL ACUITY: OU: 1

## 2023-09-23 NOTE — PATIENT INSTRUCTIONS
Plenty of fluids  Toradol as directed for pain, do not take Ibuprofen in addition to Toradol  Amoxicillin as directed  Call dentist ASAP on Monday for appt  Any worsening pain,  facial swelling, fever, you are advised to go to ER
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
## 2023-12-27 ENCOUNTER — OFFICE VISIT (OUTPATIENT)
Dept: PRIMARY CARE CLINIC | Age: 33
End: 2023-12-27
Payer: COMMERCIAL

## 2023-12-27 DIAGNOSIS — Z76.89 ENCOUNTER TO ESTABLISH CARE: ICD-10-CM

## 2023-12-27 DIAGNOSIS — F32.A MODERATE DEPRESSIVE EPISODE: Primary | ICD-10-CM

## 2023-12-27 PROCEDURE — 99203 OFFICE O/P NEW LOW 30 MIN: CPT | Performed by: FAMILY MEDICINE

## 2023-12-27 RX ORDER — FLUOXETINE HYDROCHLORIDE 20 MG/1
20 CAPSULE ORAL DAILY
Qty: 30 CAPSULE | Refills: 3 | Status: SHIPPED | OUTPATIENT
Start: 2023-12-27

## 2023-12-27 NOTE — PROGRESS NOTES
Funmi Jacome (:  1990) is a 28 y.o. male,New patient, here for evaluation of the following chief complaint(s):  New Patient (Patient presents today to establish care. )         ASSESSMENT/PLAN:  1. Moderate depressive episode  -     FLUoxetine (PROZAC) 20 MG capsule; Take 1 capsule by mouth daily, Disp-30 capsule, R-3Normal  -     External Referral To Behavioral Health  2. Encounter to establish care      PHQ-9 score 13. Patient meets criteria for moderate depression. I discussed potential treatment options with patient including counseling and medication which patient is agreeable to doing both given this is been shown to be the most effective treatment. Will start patient on Prozac 20 mg at this time. Did discuss with patient that this may cause some weight gain and can also cause delayed ejaculation or difficulty obtaining erections. Discussed with patient that it will take 4 to 6 weeks to reach peak effect though he should start feeling some effect by week 3 or 4. There may be some seasonal component to this and it may be worthwhile considering coming back off the medication once life circumstances improve and the seasons change early to mid next year. I have sent through a counseling referral as well as patient was agreeable to this in addition to the medicine      Return in about 4 weeks (around 2024). Subjective   SUBJECTIVE/OBJECTIVE:  Funmi Jacome is a 28 y.o. male who presents to establish care. Patient says he has been feeling Danyell Aland' for the last month. Patient says he has been feeling down and has had little less energy. Wife also notes that he has been eating less and shorter to anger. Patient says he was recently laid off from his job and has several other stressful factors in his life that he feels as though may be contributing. Patient also reports that he has had trouble focusing and thinks that this is becoming a big issue as well.   Patient says he

## 2024-01-24 ENCOUNTER — OFFICE VISIT (OUTPATIENT)
Dept: PRIMARY CARE CLINIC | Age: 34
End: 2024-01-24
Payer: COMMERCIAL

## 2024-01-24 VITALS
HEART RATE: 63 BPM | BODY MASS INDEX: 33.4 KG/M2 | RESPIRATION RATE: 16 BRPM | SYSTOLIC BLOOD PRESSURE: 118 MMHG | OXYGEN SATURATION: 98 % | TEMPERATURE: 97.9 F | WEIGHT: 252 LBS | DIASTOLIC BLOOD PRESSURE: 80 MMHG | HEIGHT: 73 IN

## 2024-01-24 DIAGNOSIS — F99 INSOMNIA DUE TO OTHER MENTAL DISORDER: ICD-10-CM

## 2024-01-24 DIAGNOSIS — F51.05 INSOMNIA DUE TO OTHER MENTAL DISORDER: ICD-10-CM

## 2024-01-24 DIAGNOSIS — F32.A MODERATE DEPRESSIVE EPISODE: Primary | ICD-10-CM

## 2024-01-24 PROCEDURE — 99214 OFFICE O/P EST MOD 30 MIN: CPT | Performed by: FAMILY MEDICINE

## 2024-01-24 RX ORDER — TRAZODONE HYDROCHLORIDE 100 MG/1
100 TABLET ORAL NIGHTLY
Qty: 90 TABLET | Refills: 1 | Status: SHIPPED | OUTPATIENT
Start: 2024-01-24

## 2024-01-24 ASSESSMENT — PATIENT HEALTH QUESTIONNAIRE - PHQ9
SUM OF ALL RESPONSES TO PHQ QUESTIONS 1-9: 3
7. TROUBLE CONCENTRATING ON THINGS, SUCH AS READING THE NEWSPAPER OR WATCHING TELEVISION: 0
SUM OF ALL RESPONSES TO PHQ QUESTIONS 1-9: 3
6. FEELING BAD ABOUT YOURSELF - OR THAT YOU ARE A FAILURE OR HAVE LET YOURSELF OR YOUR FAMILY DOWN: 0
4. FEELING TIRED OR HAVING LITTLE ENERGY: 0
SUM OF ALL RESPONSES TO PHQ QUESTIONS 1-9: 3
5. POOR APPETITE OR OVEREATING: 0
9. THOUGHTS THAT YOU WOULD BE BETTER OFF DEAD, OR OF HURTING YOURSELF: 0
SUM OF ALL RESPONSES TO PHQ QUESTIONS 1-9: 3
3. TROUBLE FALLING OR STAYING ASLEEP: 3
2. FEELING DOWN, DEPRESSED OR HOPELESS: 0
8. MOVING OR SPEAKING SO SLOWLY THAT OTHER PEOPLE COULD HAVE NOTICED. OR THE OPPOSITE, BEING SO FIGETY OR RESTLESS THAT YOU HAVE BEEN MOVING AROUND A LOT MORE THAN USUAL: 0
1. LITTLE INTEREST OR PLEASURE IN DOING THINGS: 0
SUM OF ALL RESPONSES TO PHQ9 QUESTIONS 1 & 2: 0

## 2024-01-24 ASSESSMENT — ENCOUNTER SYMPTOMS
BLOOD IN STOOL: 0
SHORTNESS OF BREATH: 0
CHEST TIGHTNESS: 0
WHEEZING: 0

## 2024-01-24 NOTE — PROGRESS NOTES
stool.   Genitourinary:  Negative for difficulty urinating and urgency.   Neurological:  Negative for weakness and headaches.   Psychiatric/Behavioral:  Negative for confusion.           Objective   Physical Exam  Vitals reviewed.   Constitutional:       General: He is not in acute distress.     Appearance: Normal appearance. He is not ill-appearing.   HENT:      Head: Normocephalic and atraumatic.   Cardiovascular:      Rate and Rhythm: Normal rate and regular rhythm.      Pulses: Normal pulses.      Heart sounds: Normal heart sounds. No murmur heard.  Pulmonary:      Effort: Pulmonary effort is normal. No respiratory distress.      Breath sounds: Normal breath sounds. No wheezing or rhonchi.   Chest:      Chest wall: No tenderness.   Abdominal:      General: Abdomen is flat. Bowel sounds are normal. There is no distension.      Palpations: Abdomen is soft.      Tenderness: There is no abdominal tenderness.   Musculoskeletal:         General: No swelling.   Skin:     General: Skin is warm and dry.   Neurological:      General: No focal deficit present.      Mental Status: He is alert.            Vitals:    01/24/24 1531   BP: 118/80   Pulse: 63   Resp: 16   Temp: 97.9 °F (36.6 °C)   SpO2: 98%        Current Outpatient Medications   Medication Sig Dispense Refill    traZODone (DESYREL) 100 MG tablet Take 1 tablet by mouth nightly 90 tablet 1    FLUoxetine (PROZAC) 20 MG capsule Take 1 capsule by mouth daily 30 capsule 3     No current facility-administered medications for this visit.      No family history on file.   No past medical history on file.   No past surgical history on file.   No Known Allergies     Lab Results   Component Value Date     02/09/2016    K 4.3 02/09/2016    CL 98 02/09/2016    CO2 30 (H) 02/09/2016    BUN 10 02/09/2016    CREATININE 0.7 02/09/2016    GLUCOSE 82 02/09/2016    CALCIUM 9.4 02/09/2016    PROT 8.3 06/27/2012    LABALBU 4.9 (H) 06/27/2012    ALKPHOS 86 06/27/2012    AST 35

## 2024-02-26 ENCOUNTER — OFFICE VISIT (OUTPATIENT)
Dept: PRIMARY CARE CLINIC | Age: 34
End: 2024-02-26
Payer: COMMERCIAL

## 2024-02-26 VITALS
TEMPERATURE: 98.4 F | SYSTOLIC BLOOD PRESSURE: 124 MMHG | OXYGEN SATURATION: 98 % | DIASTOLIC BLOOD PRESSURE: 76 MMHG | BODY MASS INDEX: 31.68 KG/M2 | HEIGHT: 73 IN | WEIGHT: 239 LBS | HEART RATE: 62 BPM | RESPIRATION RATE: 16 BRPM

## 2024-02-26 DIAGNOSIS — F32.A MODERATE DEPRESSIVE EPISODE: ICD-10-CM

## 2024-02-26 DIAGNOSIS — G47.01 INSOMNIA DUE TO MEDICAL CONDITION: Primary | ICD-10-CM

## 2024-02-26 PROCEDURE — 99214 OFFICE O/P EST MOD 30 MIN: CPT | Performed by: FAMILY MEDICINE

## 2024-02-26 ASSESSMENT — ENCOUNTER SYMPTOMS
WHEEZING: 0
ABDOMINAL PAIN: 0
BLOOD IN STOOL: 0
CHEST TIGHTNESS: 0
SHORTNESS OF BREATH: 0

## 2024-02-26 ASSESSMENT — SLEEP AND FATIGUE QUESTIONNAIRES
ESS TOTAL SCORE: 20
HOW LIKELY ARE YOU TO NOD OFF OR FALL ASLEEP WHILE SITTING AND TALKING TO SOMEONE: 0
HOW LIKELY ARE YOU TO NOD OFF OR FALL ASLEEP IN A CAR, WHILE STOPPED FOR A FEW MINUTES IN TRAFFIC: 2
HOW LIKELY ARE YOU TO NOD OFF OR FALL ASLEEP WHILE SITTING INACTIVE IN A PUBLIC PLACE: 3
HOW LIKELY ARE YOU TO NOD OFF OR FALL ASLEEP WHILE WATCHING TV: 3
HOW LIKELY ARE YOU TO NOD OFF OR FALL ASLEEP WHEN YOU ARE A PASSENGER IN A CAR FOR AN HOUR WITHOUT A BREAK: 3
HOW LIKELY ARE YOU TO NOD OFF OR FALL ASLEEP WHILE SITTING AND READING: 3
HOW LIKELY ARE YOU TO NOD OFF OR FALL ASLEEP WHILE SITTING QUIETLY AFTER LUNCH WITHOUT ALCOHOL: 3
HOW LIKELY ARE YOU TO NOD OFF OR FALL ASLEEP WHILE LYING DOWN TO REST IN THE AFTERNOON WHEN CIRCUMSTANCES PERMIT: 3

## 2024-02-26 NOTE — PROGRESS NOTES
Claude Shook (:  1990) is a 33 y.o. male,Established patient, here for evaluation of the following chief complaint(s):  Insomnia (Still having trouble with sleep. Trazodone made him have bad dreams and he could not take it. Waking up about every hour, but will fall asleep during the day if he gets comfortable. Snores a lot while sleeping.)         ASSESSMENT/PLAN:  1. Insomnia due to medical condition  -     Ambulatory referral to Sleep Medicine  -     Home Sleep Study; Future  2. Moderate depressive episode      Will discontinue trazodone at this time.  [place] sleepiness score of 20.  Patient has multiple symptoms that are consistent with likely sleep apnea.  I discussed with patient I do not think that any medicine is going to be able to fix this and is likely the patient needs a CPAP.  Home sleep study has been ordered and I have gone ahead and placed a referral for sleep medicine as well.  Patient asked if he could try one of the over-the-counter mouthguard to see if this may help and I did encourage him to maybe give this a try as this may give him some temporary relief.  Will maintain Zoloft as prescribed.  I do not believe that patient's depression is the primary cause behind his sleep issues.    Return in about 3 months (around 2024).         Subjective   SUBJECTIVE/OBJECTIVE:  Claude Shook is a 33 y.o. male who presents for follow-up.  Patient says that the Zoloft is working well for him though he still feels very unwell principally because he does not feel like he is getting enough sleep.  Patient says he did try the trazodone and this made him feel extremely groggy and gave him nightmares.  Patient only took 4 doses and has since quit the medication.  Patient says that he wakes up every few hours and he is unable to get good restful sleep because of this.  Patient's wife states that he will often gargle and snore loudly and often will wake himself up as a result.  She says it seems

## 2024-03-15 ENCOUNTER — HOSPITAL ENCOUNTER (OUTPATIENT)
Dept: SLEEP CENTER | Age: 34
Discharge: HOME | End: 2024-03-17
Payer: COMMERCIAL

## 2024-03-15 DIAGNOSIS — G47.01 INSOMNIA DUE TO MEDICAL CONDITION: ICD-10-CM

## 2024-03-15 NOTE — PROGRESS NOTES
Mr. Boone Arce presented today in the sleep center for a Home Sleep Test (HST).  Mr. Arce was instructed on the device and was requested to wear the unit for two nights.  was asked to have the HST monitor back by 10AM on  03/18/2024. The patient acknowledged he understood. The HST device was tested and was in working order.

## 2024-03-16 PROCEDURE — G0399 HOME SLEEP TEST/TYPE 3 PORTA: HCPCS

## 2024-03-17 PROCEDURE — G0399 HOME SLEEP TEST/TYPE 3 PORTA: HCPCS

## 2024-03-21 NOTE — PROGRESS NOTES
[facility]  [facility] [place] [address]  Phone [phone] Fax [phone]       Patient Name Claude Shook Account Number [id number]    1990 Referring Provider Alek Tamayo M.D.   Age/ Gender 33 years/M Interpreting physician Kentrell Quinonez M.D., FCCP, DABSM   Neck circumference Unavailable Device Stardust II   Mallampati classification Unavailable Scoring Technician Guerline Key, CRT, RPSGT   [place] score  Indications for the test excessive daytime somnolence   Height 73.0 in Test Home Sleep Apnea Test   Weight 239.0 lbs Date of test 3/16/2024   BMI 31.5 Time in Bed (TIB) 552.3 minutes     Events   Index (#/hr) Total # Events Mean Duration (sec) Max Duration (sec) Supine        # Index   Central Apneas 0.0 0 0.0 0.0 0 0.0   Obstructive Apneas 3.6 33 15.2 29.0 25 4.2   Mixed Apneas 0.0 0 0.0 0.0 0 0.0   Hypopneas 7.8 72 28.3 77.0 67 11.3   Estimated AHI  #events/TIB (hrs) 11.4 105 24.2 77.0 15.6   Time in Position (minutes) 354.4     Snoring  Snoring Rating (loudness 0-4) 2   Snoring Amount (% of total sleep time with snoring) 61.8     Oximetry distribution  <90 %  (minutes) 1.6   <85 %  (minutes) 0.0   <80 %  (minutes) 0.0   <75 %  (minutes) 0.0   <70 %  (minutes) 0.0   <60 %  (minutes) 0.0   <50 %  (minutes) 0.0   Average (%) 96   Desat Index (#/hour) 10.6   Desat Max (%) 12   Desat Max dur (sec) 85.0   Lowest SpO2 (³ 2 sec) (%) 86     Heart Rate  Mean HR (BPM) 54.5   # of LHR 59   LHR min (BPM) 38   # of HHR 7   HHR max (BPM) 110       Interpretation:     Mild obstructive sleep apnea.   Obesity.   Subjective hypersomnia.     Recommendations:    Consider Auto CPAP to titrate between 8cm water pressure and 20cm water pressure.   Weight loss and exercise.   Avoid risky activity such as driving if sleepy.   Discuss sleep hygiene with the patient.   Monitor PAP use and effectiveness.       Kentrell Quinonez MD, [facility], [facility]

## 2024-03-21 NOTE — PROGRESS NOTES
[facility]  [facility] [place] [address]  Phone [phone] Fax [phone]       Patient Name Claude Shook Account Number [id number]    1990 Referring Provider Alek Tamayo M.D.   Age/ Gender 33 years/M Interpreting physician Kentrell Quinonez M.D., FCCP, DABSM   Neck circumference Unavailable Device Stardust II   Mallampati classification Unavailable Scoring Technician Guerline Key, CRT, RPSGT   [place] score  Indications for the test excessive daytime somnolence   Height 73.0 in Test Home Sleep Apnea Test   Weight 239.0 lbs Date of test 3/17/2024   BMI 31.5 Time in Bed (TIB) 358.5 minutes     Events   Index (#/hr) Total # Events Mean Duration (sec) Max Duration (sec) Supine        # Index   Central Apneas 0.0 0 0.0 0.0 0 0.0   Obstructive Apneas 2.5 15 18.4 44.5 14 3.2   Mixed Apneas 0.0 0 0.0 0.0 0 0.0   Hypopneas 6.0 36 33.0 83.0 35 8.0   Estimated AHI  #events/TIB (hrs) 8.5 51 28.7 83.0 11.3   Time in Position (minutes) 261.3     Snoring  Snoring Rating (loudness 0-4) 2   Snoring Amount (% of total sleep time with snoring) 53.1     Oximetry distribution  <90 %  (minutes) 0.2   <85 %  (minutes) 0.2   <80 %  (minutes) 0.0   <75 %  (minutes) 0.0   <70 %  (minutes) 0.0   <60 %  (minutes) 0.0   <50 %  (minutes) 0.0   Average (%) 96   Desat Index (#/hour) 8.4   Desat Max (%) 8   Desat Max dur (sec) 85.5   Lowest SpO2 (³ 2 sec) (%) 84     Heart Rate  Mean HR (BPM) 52.8   # of LHR 35   LHR min (BPM) 38   # of HHR 1   HHR max (BPM) 101         Interpretation:      Mild obstructive sleep apnea.   Obesity.   Subjective hypersomnia.      Recommendations:     Consider Auto CPAP to titrate between 8cm water pressure and 20cm water pressure.   Weight loss and exercise.   Avoid risky activity such as driving if sleepy.   Discuss sleep hygiene with the patient.   Monitor PAP use and effectiveness.        Kentrell Quinonez MD, FCCP, [facility]

## 2024-03-23 DIAGNOSIS — G47.33 OSA (OBSTRUCTIVE SLEEP APNEA): Primary | ICD-10-CM

## 2024-03-28 ENCOUNTER — TELEPHONE (OUTPATIENT)
Dept: SLEEP CENTER | Age: 34
End: 2024-03-28

## 2024-03-28 NOTE — TELEPHONE ENCOUNTER
Attempted to leave a  voicemail with Mr. Claude Shook.  However, the phone number provided ([phone]) is not a working number. Mr. Shook's HST on 03/16/2024 revealed an AHI of 11.4  and the study on 11/17/2024 revealed an AHI of 8.5.  An AHI between 5 and 14.9 is considered to be within the mild range.    The interpreting physician wrote orders for an APAP with a minimum pressure of 8cm/H2O and a maximum pressure of 20cm/H2O.  Orders, documentation and insurance information were sent to [facility] today.  Results were routed to the ordering provider, Alek Tamayo M.D.

## 2024-04-13 ENCOUNTER — HOSPITAL ENCOUNTER (EMERGENCY)
Age: 34
Discharge: HOME | End: 2024-04-13
Attending: EMERGENCY MEDICINE
Payer: COMMERCIAL

## 2024-04-13 VITALS
HEART RATE: 57 BPM | WEIGHT: 225 LBS | OXYGEN SATURATION: 99 % | TEMPERATURE: 98.1 F | DIASTOLIC BLOOD PRESSURE: 85 MMHG | RESPIRATION RATE: 26 BRPM | SYSTOLIC BLOOD PRESSURE: 148 MMHG | BODY MASS INDEX: 29.69 KG/M2

## 2024-04-13 DIAGNOSIS — F11.93 OPIATE WITHDRAWAL (HCC): Primary | ICD-10-CM

## 2024-04-13 PROCEDURE — 6370000000 HC RX 637 (ALT 250 FOR IP): Performed by: EMERGENCY MEDICINE

## 2024-04-13 PROCEDURE — 99284 EMERGENCY DEPT VISIT MOD MDM: CPT

## 2024-04-13 PROCEDURE — 96372 THER/PROPH/DIAG INJ SC/IM: CPT

## 2024-04-13 PROCEDURE — 6360000002 HC RX W HCPCS: Performed by: EMERGENCY MEDICINE

## 2024-04-13 RX ORDER — PROMETHAZINE HYDROCHLORIDE 12.5 MG/1
12.5-25 TABLET ORAL 3 TIMES DAILY PRN
Qty: 20 TABLET | Refills: 0 | Status: SHIPPED | OUTPATIENT
Start: 2024-04-13 | End: 2024-04-20

## 2024-04-13 RX ORDER — CLONIDINE HYDROCHLORIDE 0.1 MG/1
0.1 TABLET ORAL 3 TIMES DAILY PRN
Qty: 20 TABLET | Refills: 0 | Status: SHIPPED | OUTPATIENT
Start: 2024-04-13

## 2024-04-13 RX ORDER — CYCLOBENZAPRINE HCL 10 MG
10 TABLET ORAL 3 TIMES DAILY PRN
Qty: 15 TABLET | Refills: 0 | Status: SHIPPED | OUTPATIENT
Start: 2024-04-13 | End: 2024-04-23

## 2024-04-13 RX ORDER — CLONIDINE HYDROCHLORIDE 0.1 MG/1
0.1 TABLET ORAL ONCE
Status: COMPLETED | OUTPATIENT
Start: 2024-04-13 | End: 2024-04-13

## 2024-04-13 RX ORDER — ONDANSETRON 4 MG/1
4 TABLET, ORALLY DISINTEGRATING ORAL 3 TIMES DAILY PRN
Qty: 21 TABLET | Refills: 0 | Status: SHIPPED | OUTPATIENT
Start: 2024-04-13

## 2024-04-13 RX ORDER — ORPHENADRINE CITRATE 30 MG/ML
60 INJECTION INTRAMUSCULAR; INTRAVENOUS ONCE
Status: COMPLETED | OUTPATIENT
Start: 2024-04-13 | End: 2024-04-13

## 2024-04-13 RX ORDER — PROMETHAZINE HYDROCHLORIDE 25 MG/1
25 TABLET ORAL ONCE
Status: COMPLETED | OUTPATIENT
Start: 2024-04-13 | End: 2024-04-13

## 2024-04-13 RX ADMIN — PROMETHAZINE HYDROCHLORIDE 25 MG: 25 TABLET ORAL at 22:50

## 2024-04-13 RX ADMIN — ORPHENADRINE CITRATE 60 MG: 60 INJECTION INTRAMUSCULAR; INTRAVENOUS at 23:22

## 2024-04-13 RX ADMIN — CLONIDINE HYDROCHLORIDE 0.1 MG: 0.1 TABLET ORAL at 22:50

## 2024-04-14 NOTE — ED PROVIDER NOTES
[facility] EMERGENCY DEPT  EMERGENCY DEPARTMENT ENCOUNTER      Pt Name: Claude Shook  MRN: [id number]  Birthdate 1990  Date of evaluation: 4/13/2024  Provider: Wes Monroy Jr, MD    CHIEF COMPLAINT       Chief Complaint   Patient presents with    Withdrawal     Fentanyl, last used Thursday morning          HISTORY OF PRESENT ILLNESS   (Location/Symptom, Timing/Onset,Context/Setting, Quality, Duration, Modifying Factors, Severity)  Note limiting factors.   Claude Shook is a 33 y.o. male who presents to the emergency department for evaluation after having opiate withdrawal after having been taking fentanyl/oxycodone daily for over a week.  Says his last dose was 3 days ago.  Yesterday he started feeling shaky all over with muscle cramps, nausea, and says that he has not been able to sleep in 3 days.  He is currently in a rehab facility at [place].    HPI    NursingNotes were reviewed.    REVIEW OF SYSTEMS    (2-9 systems for level 4, 10 or more for level 5)     Review of Systems   Constitutional: Negative.    HENT: Negative.     Eyes: Negative.    Respiratory: Negative.     Cardiovascular: Negative.    Gastrointestinal: Negative.    Genitourinary: Negative.    Musculoskeletal:  Positive for myalgias.   Skin: Negative.    Neurological: Negative.    Hematological: Negative.    Psychiatric/Behavioral:  Positive for sleep disturbance. The patient is nervous/anxious.        A complete review of systems was performed and is negative except as noted above in the HPI.       PAST MEDICAL HISTORY   History reviewed. No pertinent past medical history.      SURGICAL HISTORY     History reviewed. No pertinent surgical history.      CURRENT MEDICATIONS       Discharge Medication List as of 4/13/2024 11:24 PM        CONTINUE these medications which have NOT CHANGED    Details   FLUoxetine (PROZAC) 20 MG capsule Take 1 capsule by mouth daily, Disp-30 capsule, R-3Normal             ALLERGIES     Patient has no known
